# Patient Record
Sex: FEMALE | Race: WHITE | Employment: OTHER | URBAN - METROPOLITAN AREA
[De-identification: names, ages, dates, MRNs, and addresses within clinical notes are randomized per-mention and may not be internally consistent; named-entity substitution may affect disease eponyms.]

---

## 2017-01-04 ENCOUNTER — LAB ENCOUNTER (OUTPATIENT)
Dept: LAB | Age: 82
End: 2017-01-04
Attending: INTERNAL MEDICINE
Payer: MEDICARE

## 2017-01-04 DIAGNOSIS — I48.91 ATRIAL FIBRILLATION (HCC): Primary | ICD-10-CM

## 2017-01-04 LAB
INR BLD: 3.2 (ref 0.9–1.2)
PROTHROMBIN TIME: 32.3 SECONDS (ref 11.8–14.5)

## 2017-01-04 PROCEDURE — 85610 PROTHROMBIN TIME: CPT

## 2017-01-04 PROCEDURE — 36415 COLL VENOUS BLD VENIPUNCTURE: CPT

## 2017-01-06 ENCOUNTER — TELEPHONE (OUTPATIENT)
Dept: PULMONOLOGY | Facility: CLINIC | Age: 82
End: 2017-01-06

## 2017-01-06 RX ORDER — AMOXICILLIN AND CLAVULANATE POTASSIUM 875; 125 MG/1; MG/1
1 TABLET, FILM COATED ORAL 2 TIMES DAILY
Qty: 10 TABLET | Refills: 0 | Status: SHIPPED | OUTPATIENT
Start: 2017-01-06 | End: 2017-03-24 | Stop reason: ALTCHOICE

## 2017-01-06 NOTE — TELEPHONE ENCOUNTER
Pt. Mikayla Jernigan, states that pt. continues to have a very wet cough. She is not sure if pt. Should come in to see [address] [facility]? Please call to discuss. Previous encounter is closed.

## 2017-01-06 NOTE — TELEPHONE ENCOUNTER
Pt daughter reports pt still has productive cough starting x 2 weeks ago, expelling yellow colored sputum. Pt finished rx of z-pack and PSC last week. Pt daughter denies pt has fever and or SOB.  Pt daughter reports the cough sounds raspy and mucous seems t

## 2017-01-06 NOTE — TELEPHONE ENCOUNTER
Verbal Order from Dr. Cinda Cam, Amoxicillin 875 mg 1 tab PO BID #10 and for pt to inquire from pharmacist which OTC cough syrup to use. Pt daughter informed of AR orders, pharmacy verified, pt daughter voiced understanding.

## 2017-01-24 NOTE — TELEPHONE ENCOUNTER
Current Outpatient Prescriptions:  lisinopril (PRINIVIL,ZESTRIL) 40 MG Oral Tab TAKE 1 TABLET DAILY Disp: 90 tablet Rfl: 1     Refill

## 2017-01-25 RX ORDER — LISINOPRIL 40 MG/1
40 TABLET ORAL
Qty: 90 TABLET | Refills: 1 | Status: SHIPPED | OUTPATIENT
Start: 2017-01-25 | End: 2017-07-17

## 2017-01-26 ENCOUNTER — APPOINTMENT (OUTPATIENT)
Dept: LAB | Age: 82
End: 2017-01-26
Attending: INTERNAL MEDICINE
Payer: MEDICARE

## 2017-01-26 DIAGNOSIS — I48.91 ATRIAL FIBRILLATION (HCC): ICD-10-CM

## 2017-01-26 LAB
INR BLD: 2.4 (ref 0.9–1.2)
PROTHROMBIN TIME: 25.9 SECONDS (ref 11.8–14.5)

## 2017-01-26 PROCEDURE — 85610 PROTHROMBIN TIME: CPT

## 2017-01-26 PROCEDURE — 36415 COLL VENOUS BLD VENIPUNCTURE: CPT

## 2017-02-02 ENCOUNTER — TELEPHONE (OUTPATIENT)
Dept: PULMONOLOGY | Facility: CLINIC | Age: 82
End: 2017-02-02

## 2017-02-03 ENCOUNTER — PRIOR ORIGINAL RECORDS (OUTPATIENT)
Dept: OTHER | Age: 82
End: 2017-02-03

## 2017-02-03 ENCOUNTER — MYAURORA ACCOUNT LINK (OUTPATIENT)
Dept: OTHER | Age: 82
End: 2017-02-03

## 2017-02-03 NOTE — TELEPHONE ENCOUNTER
Attempted to call Dr. Nasrin Curry office but was disconnected twice. Sent fax stating we do no have any current lipid or CMP results for this patient. Last lipid was 2012. If they need anything else they can contact our office.

## 2017-02-03 NOTE — TELEPHONE ENCOUNTER
Spoke to patient who gave permission to speak to her daughter Risa Arias. She gave authorization to fax any labs or notes that are needed.

## 2017-02-14 ENCOUNTER — PRIOR ORIGINAL RECORDS (OUTPATIENT)
Dept: OTHER | Age: 82
End: 2017-02-14

## 2017-02-14 ENCOUNTER — HOSPITAL ENCOUNTER (OUTPATIENT)
Dept: GENERAL RADIOLOGY | Facility: HOSPITAL | Age: 82
Discharge: HOME | End: 2017-02-14
Attending: INTERNAL MEDICINE
Payer: MEDICARE

## 2017-02-14 DIAGNOSIS — R05.9 COUGH: Primary | ICD-10-CM

## 2017-02-14 DIAGNOSIS — R05.9 COUGH IN ADULT: ICD-10-CM

## 2017-02-14 LAB
ALBUMIN SERPL BCP-MCNC: 3.6 G/DL (ref 3.5–4.8)
ANION GAP SERPL CALC-SCNC: 9 MMOL/L (ref 0–18)
BASOPHILS # BLD: 0 K/UL (ref 0–0.2)
BASOPHILS NFR BLD: 1 %
BUN SERPL-MCNC: 18 MG/DL (ref 8–20)
BUN/CREAT SERPL: 18.6 (ref 10–20)
CALCIUM SERPL-MCNC: 8.8 MG/DL (ref 8.5–10.5)
CHLORIDE SERPL-SCNC: 98 MMOL/L (ref 95–110)
CO2 SERPL-SCNC: 31 MMOL/L (ref 22–32)
CREAT SERPL-MCNC: 0.97 MG/DL (ref 0.5–1.5)
EOSINOPHIL # BLD: 0 K/UL (ref 0–0.7)
EOSINOPHIL NFR BLD: 0 %
ERYTHROCYTE [DISTWIDTH] IN BLOOD BY AUTOMATED COUNT: 15.7 % (ref 11–15)
GLUCOSE SERPL-MCNC: 125 MG/DL (ref 70–99)
HCT VFR BLD AUTO: 33.9 % (ref 35–48)
HGB BLD-MCNC: 11.3 G/DL (ref 12–16)
LYMPHOCYTES # BLD: 0.5 K/UL (ref 1–4)
LYMPHOCYTES NFR BLD: 14 %
MCH RBC QN AUTO: 31.6 PG (ref 27–32)
MCHC RBC AUTO-ENTMCNC: 33.5 G/DL (ref 32–37)
MCV RBC AUTO: 94.4 FL (ref 80–100)
MONOCYTES # BLD: 0.5 K/UL (ref 0–1)
MONOCYTES NFR BLD: 16 %
NEUTROPHILS # BLD AUTO: 2.4 K/UL (ref 1.8–7.7)
NEUTROPHILS NFR BLD: 70 %
OSMOLALITY UR CALC.SUM OF ELEC: 289 MOSM/KG (ref 275–295)
PHOSPHATE SERPL-MCNC: 3.5 MG/DL (ref 2.4–4.7)
PLATELET # BLD AUTO: 169 K/UL (ref 140–400)
PMV BLD AUTO: 7.9 FL (ref 7.4–10.3)
POTASSIUM SERPL-SCNC: 4 MMOL/L (ref 3.3–5.1)
RBC # BLD AUTO: 3.59 M/UL (ref 3.7–5.4)
SODIUM SERPL-SCNC: 138 MMOL/L (ref 136–144)
WBC # BLD AUTO: 3.4 K/UL (ref 4–11)

## 2017-02-14 PROCEDURE — 80069 RENAL FUNCTION PANEL: CPT | Performed by: INTERNAL MEDICINE

## 2017-02-14 PROCEDURE — 36415 COLL VENOUS BLD VENIPUNCTURE: CPT | Performed by: INTERNAL MEDICINE

## 2017-02-14 PROCEDURE — 85025 COMPLETE CBC W/AUTO DIFF WBC: CPT | Performed by: INTERNAL MEDICINE

## 2017-02-14 PROCEDURE — 71020 XR CHEST PA + LAT CHEST (CPT=71020): CPT

## 2017-02-15 ENCOUNTER — HOSPITAL (OUTPATIENT)
Dept: OTHER | Age: 82
End: 2017-02-15
Attending: FAMILY MEDICINE

## 2017-02-15 LAB
A/G RATIO_: 1.1
ABS LYMPH: 0.8 K/CUMM (ref 1–3.5)
ABS MONO: 0.6 K/CUMM (ref 0.1–0.8)
ABS NEUTRO: 6.4 K/CUMM (ref 2–8)
ALBUMIN: 3.6 G/DL (ref 3.5–5)
ALK PHOS: 50 UNIT/L (ref 50–124)
ALT/GPT: 21 UNIT/L (ref 0–55)
ANION GAP SERPL CALC-SCNC: 13 MEQ/L (ref 10–20)
APTT PPP: 23 SECONDS (ref 22–30)
AST/GOT: 50 UNIT/L (ref 5–34)
BASOPHIL: 0 % (ref 0–1)
BILI TOTAL: 1.2 MG/DL (ref 0.2–1)
BUN SERPL-MCNC: 19 MG/DL (ref 6–20)
CALCIUM: 8.5 MG/DL (ref 8.4–10.2)
CHLORIDE: 98 MEQ/L (ref 97–107)
CONTROL LINE: PRESENT
CREATININE: 0.82 MG/DL (ref 0.6–1.3)
DIFF_TYPE?: ABNORMAL
EOSINOPHIL: 0 % (ref 0–6)
GLOBULIN_: 3.2 G/DL (ref 2–4.1)
GLUCOSE LVL: 174 MG/DL (ref 70–99)
HCT VFR BLD CALC: 34 % (ref 33–45)
HEMOLYSIS 2+: NEGATIVE
HEMOLYSIS 4+: NEGATIVE
HEMOLYSIS 4+: NEGATIVE
HGB BLD-MCNC: 11.4 G/DL (ref 11–15)
ICTERIC 4+: NEGATIVE
ICTERIC 4+: NEGATIVE
IMMATURE GRAN: 0.4 % (ref 0–0.3)
INFLUENZ A: POSITIVE
INFLUENZ B: ABNORMAL
INFLUENZ COMMNT: ABNORMAL
INR: 1.5 (ref 0.9–1.1)
INSTR WBC: 7.8 K/CUMM (ref 4–11)
LACTIC ACID: 1.1 MMOL/L (ref 0.5–2.2)
LIPEMIC 3+: NEGATIVE
LYMPHOCYTE: 10 %
MCH RBC QN AUTO: 32 PG (ref 25–35)
MCHC RBC AUTO-ENTMCNC: 33 G/DL (ref 32–37)
MCV RBC AUTO: 96 FL (ref 78–97)
MONOCYTE: 7 %
NEUTROPHIL: 82 %
NRBC BLD MANUAL-RTO: 0 % (ref 0–0.2)
PLATELET: 168 K/CUMM (ref 150–450)
POTASSIUM: 3.7 MEQ/L (ref 3.5–5.1)
PROTHROMBIN TIME: 16 SECONDS (ref 9.7–11.6)
RBC # BLD: 3.6 M/CUMM (ref 3.7–5.2)
RDW: 14.8 % (ref 11.5–14.5)
SODIUM: 137 MEQ/L (ref 136–145)
TCO2: 30 MEQ/L (ref 19–29)
TOTAL CK: 435 U/L (ref 29–168)
TOTAL PROTEIN: 6.8 G/DL (ref 6.4–8.3)
TROPONIN I: 0.04 NG/ML
TROPONIN I: 0.06 NG/ML
UA APPEAR POC: CLEAR
UA APPEAR: CLEAR
UA BACTERIA: ABNORMAL
UA BILI POC: NEGATIVE
UA BILI: NEGATIVE
UA BLOOD POC: ABNORMAL
UA BLOOD: ABNORMAL
UA COLOR POC: YELLOW
UA COLOR: YELLOW
UA EPITHELIAL: ABNORMAL
UA GLUCOSE POC: NEGATIVE
UA GLUCOSE: NEGATIVE
UA KETONES POC: NEGATIVE
UA KETONES: NEGATIVE
UA LEUK EST POC: NEGATIVE
UA LEUK EST: NEGATIVE
UA NITRITE POC: NEGATIVE
UA NITRITE: NEGATIVE
UA PH POC: 5.5 (ref 5–7)
UA PH: 5.5 (ref 5–7)
UA PROTEIN POC: ABNORMAL
UA PROTEIN: ABNORMAL
UA RBC: ABNORMAL
UA SPEC GRAV POC: 1.02 (ref 1.01–1.02)
UA SPEC GRAV: 1.02 (ref 1.01–1.02)
UA UROBILIN POC: 1 MG/DL
UA UROBILINOGEN: 1 MG/DL (ref 0.2–1)
UA WBC: ABNORMAL
WBC # BLD: 7.8 K/CUMM (ref 4–11)

## 2017-02-16 LAB
ABS LYMPH: 0.7 K/CUMM (ref 1–3.5)
ABS MONO: 0.5 K/CUMM (ref 0.1–0.8)
ABS NEUTRO: 4.3 K/CUMM (ref 2–8)
ANION GAP SERPL CALC-SCNC: 9 MEQ/L (ref 10–20)
BASOPHIL: 0 % (ref 0–1)
BUN SERPL-MCNC: 17 MG/DL (ref 6–20)
CALCIUM: 8 MG/DL (ref 8.4–10.2)
CHLORIDE: 101 MEQ/L (ref 97–107)
CREATININE: 0.84 MG/DL (ref 0.6–1.3)
DEVICE SN: NORMAL
DIFF_TYPE?: ABNORMAL
EOSINOPHIL: 0 % (ref 0–6)
GLUCOSE LVL: 98 MG/DL (ref 70–99)
HCT VFR BLD CALC: 30 % (ref 33–45)
HEMOLYSIS 2+: NEGATIVE
HEMOLYSIS 4+: NEGATIVE
HGB BLD-MCNC: 10.1 G/DL (ref 11–15)
ICTERIC 4+: NEGATIVE
IMMATURE GRAN: 0.4 % (ref 0–0.3)
INR: 1.7 (ref 0.9–1.1)
INSTR WBC: 5.5 K/CUMM (ref 4–11)
LYMPHOCYTE: 13 %
MCH RBC QN AUTO: 32 PG (ref 25–35)
MCHC RBC AUTO-ENTMCNC: 33 G/DL (ref 32–37)
MCV RBC AUTO: 97 FL (ref 78–97)
MONOCYTE: 9 %
NEUTROPHIL: 78 %
NRBC BLD MANUAL-RTO: 0 % (ref 0–0.2)
PLATELET: 126 K/CUMM (ref 150–450)
PLT ESTIMATE: ABNORMAL
POC_GLU: 79 MG/DL (ref 70–99)
POC_GLU: 88 MG/DL (ref 70–99)
POC_GLU: 95 MG/DL (ref 70–99)
POC_GLU: 99 MG/DL (ref 70–99)
POTASSIUM: 3.7 MEQ/L (ref 3.5–5.1)
PROTHROMBIN TIME: 17.6 SECONDS (ref 9.7–11.6)
RBC # BLD: 3.11 M/CUMM (ref 3.7–5.2)
RBC MORPH: ABNORMAL
RDW: 15 % (ref 11.5–14.5)
SODIUM: 139 MEQ/L (ref 136–145)
TCO2: 33 MEQ/L (ref 19–29)
TECH_ID: NORMAL
TROPONIN I: 0.06 NG/ML
WBC # BLD: 5.5 K/CUMM (ref 4–11)

## 2017-02-17 LAB
ABS LYMPH: 0.8 K/CUMM (ref 1–3.5)
ABS MONO: 0.4 K/CUMM (ref 0.1–0.8)
ABS NEUTRO: 2.8 K/CUMM (ref 2–8)
ANION GAP SERPL CALC-SCNC: 9 MEQ/L (ref 10–20)
BASOPHIL: 0 % (ref 0–1)
BUN SERPL-MCNC: 12 MG/DL (ref 6–20)
CALCIUM: 8.5 MG/DL (ref 8.4–10.2)
CHLORIDE: 101 MEQ/L (ref 97–107)
CREATININE: 0.68 MG/DL (ref 0.6–1.3)
DEVICE SN: ABNORMAL
DEVICE SN: NORMAL
DIFF_TYPE?: ABNORMAL
EOSINOPHIL: 0 % (ref 0–6)
GLUCOSE LVL: 89 MG/DL (ref 70–99)
HCT VFR BLD CALC: 33 % (ref 33–45)
HEMOLYSIS 2+: NEGATIVE
HGB BLD-MCNC: 10.6 G/DL (ref 11–15)
IMMATURE GRAN: 0.5 % (ref 0–0.3)
INR: 2.5 (ref 0.9–1.1)
INSTR WBC: 4 K/CUMM (ref 4–11)
LYMPHOCYTE: 20 %
MCH RBC QN AUTO: 32 PG (ref 25–35)
MCHC RBC AUTO-ENTMCNC: 32 G/DL (ref 32–37)
MCV RBC AUTO: 98 FL (ref 78–97)
MONOCYTE: 9 %
NEUTROPHIL: 70 %
NRBC BLD MANUAL-RTO: 0 % (ref 0–0.2)
PLATELET: 140 K/CUMM (ref 150–450)
PLT ESTIMATE: ABNORMAL
POC_GLU: 163 MG/DL (ref 70–99)
POC_GLU: 87 MG/DL (ref 70–99)
POC_GLU: 87 MG/DL (ref 70–99)
POC_GLU: 99 MG/DL (ref 70–99)
POTASSIUM: 3.5 MEQ/L (ref 3.5–5.1)
PROTHROMBIN TIME: 25.9 SECONDS (ref 9.7–11.6)
RBC # BLD: 3.35 M/CUMM (ref 3.7–5.2)
RBC MORPH: ABNORMAL
RDW: 15 % (ref 11.5–14.5)
SODIUM: 139 MEQ/L (ref 136–145)
TCO2: 33 MEQ/L (ref 19–29)
TECH_ID: ABNORMAL
TECH_ID: NORMAL
WBC # BLD: 4 K/CUMM (ref 4–11)

## 2017-02-18 LAB
C DIFF COMMENT: NORMAL
C DIFF TOXIN PCR: NOT DETECTED
DEVICE SN: ABNORMAL
DEVICE SN: NORMAL
GROSS: NORMAL
INR: 3.5 (ref 0.9–1.1)
POC_GLU: 102 MG/DL (ref 70–99)
POC_GLU: 105 MG/DL (ref 70–99)
POC_GLU: 137 MG/DL (ref 70–99)
POC_GLU: 93 MG/DL (ref 70–99)
PROTHROMBIN TIME: 36.5 SECONDS (ref 9.7–11.6)
TECH_ID: ABNORMAL
TECH_ID: NORMAL

## 2017-02-19 LAB
DEVICE SN: ABNORMAL
DEVICE SN: ABNORMAL
INR: 3.1 (ref 0.9–1.1)
POC_GLU: 113 MG/DL (ref 70–99)
POC_GLU: 125 MG/DL (ref 70–99)
PROTHROMBIN TIME: 32.6 SECONDS (ref 9.7–11.6)
TECH_ID: ABNORMAL
TECH_ID: ABNORMAL

## 2017-02-20 ENCOUNTER — TELEPHONE (OUTPATIENT)
Dept: PULMONOLOGY | Facility: CLINIC | Age: 82
End: 2017-02-20

## 2017-02-20 NOTE — TELEPHONE ENCOUNTER
----- Message from Lizett Fajardo MD sent at 2/16/2017  3:30 PM CST -----  RN,  Please call the patient to let her know that the blood tests were good.

## 2017-02-21 ENCOUNTER — APPOINTMENT (OUTPATIENT)
Dept: LAB | Age: 82
End: 2017-02-21
Attending: INTERNAL MEDICINE
Payer: COMMERCIAL

## 2017-02-21 ENCOUNTER — OFFICE VISIT (OUTPATIENT)
Dept: PULMONOLOGY | Facility: CLINIC | Age: 82
End: 2017-02-21

## 2017-02-21 VITALS
WEIGHT: 122.38 LBS | BODY MASS INDEX: 25 KG/M2 | RESPIRATION RATE: 18 BRPM | HEIGHT: 58.5 IN | OXYGEN SATURATION: 94 % | SYSTOLIC BLOOD PRESSURE: 122 MMHG | DIASTOLIC BLOOD PRESSURE: 75 MMHG | HEART RATE: 98 BPM

## 2017-02-21 DIAGNOSIS — I48.91 ATRIAL FIBRILLATION (HCC): ICD-10-CM

## 2017-02-21 DIAGNOSIS — R05.9 COUGH IN ADULT: Primary | ICD-10-CM

## 2017-02-21 DIAGNOSIS — R91.1 PULMONARY NODULE: ICD-10-CM

## 2017-02-21 LAB
INR BLD: 2.2 (ref 0.9–1.2)
PROTHROMBIN TIME: 23.6 SECONDS (ref 11.8–14.5)

## 2017-02-21 PROCEDURE — G0463 HOSPITAL OUTPT CLINIC VISIT: HCPCS | Performed by: INTERNAL MEDICINE

## 2017-02-21 PROCEDURE — 99213 OFFICE O/P EST LOW 20 MIN: CPT | Performed by: INTERNAL MEDICINE

## 2017-02-21 PROCEDURE — 36415 COLL VENOUS BLD VENIPUNCTURE: CPT

## 2017-02-21 PROCEDURE — 85610 PROTHROMBIN TIME: CPT

## 2017-02-21 PROCEDURE — 94761 N-INVAS EAR/PLS OXIMETRY MLT: CPT | Performed by: INTERNAL MEDICINE

## 2017-02-21 RX ORDER — HALOPERIDOL 2 MG/1
2 TABLET ORAL NIGHTLY
Qty: 30 TABLET | Refills: 6 | Status: SHIPPED | OUTPATIENT
Start: 2017-02-21 | End: 2017-03-24

## 2017-02-21 NOTE — PROGRESS NOTES
The patient is an 42-year-old female who I know well from prior evaluation who comes in now for follow-up. There is a sundowning phenomenon.   She was recently hospitalized at [facility] with influenza and urinary tract infection with possible pneumo

## 2017-03-07 ENCOUNTER — PRIOR ORIGINAL RECORDS (OUTPATIENT)
Dept: OTHER | Age: 82
End: 2017-03-07

## 2017-03-07 LAB — INR: 4

## 2017-03-15 ENCOUNTER — TELEPHONE (OUTPATIENT)
Dept: PULMONOLOGY | Facility: CLINIC | Age: 82
End: 2017-03-15

## 2017-03-15 NOTE — TELEPHONE ENCOUNTER
CMS form for  O2 re cert - needs for medicare - was faxed over this morning - asking if it was received

## 2017-03-24 PROBLEM — G30.9 ALZHEIMER'S DEMENTIA WITH BEHAVIORAL DISTURBANCE, UNSPECIFIED TIMING OF DEMENTIA ONSET: Status: ACTIVE | Noted: 2017-03-24

## 2017-03-24 PROBLEM — J44.9 COPD (CHRONIC OBSTRUCTIVE PULMONARY DISEASE) WITH CHRONIC BRONCHITIS (HCC): Status: ACTIVE | Noted: 2017-03-24

## 2017-03-24 PROBLEM — G30.9 ALZHEIMER'S DEMENTIA WITH BEHAVIORAL DISTURBANCE, UNSPECIFIED TIMING OF DEMENTIA ONSET (HCC): Status: ACTIVE | Noted: 2017-03-24

## 2017-03-24 PROBLEM — F02.81 ALZHEIMER'S DEMENTIA WITH BEHAVIORAL DISTURBANCE, UNSPECIFIED TIMING OF DEMENTIA ONSET (HCC): Status: ACTIVE | Noted: 2017-03-24

## 2017-03-24 PROBLEM — J18.9 PNEUMONIA DUE TO INFECTIOUS ORGANISM, UNSPECIFIED LATERALITY, UNSPECIFIED PART OF LUNG: Status: ACTIVE | Noted: 2017-03-24

## 2017-03-24 PROBLEM — F02.81 ALZHEIMER'S DEMENTIA WITH BEHAVIORAL DISTURBANCE, UNSPECIFIED TIMING OF DEMENTIA ONSET: Status: ACTIVE | Noted: 2017-03-24

## 2017-04-23 ENCOUNTER — HOSPITAL (OUTPATIENT)
Dept: OTHER | Age: 82
End: 2017-04-23
Attending: FAMILY MEDICINE

## 2017-04-23 LAB
A/G RATIO_: 1
ABG GLU: 125 MG/G (ref 65–95)
ABG HCT: 33 % (ref 37–50)
ABG ION CALCIUM: 4.7 MG/DL (ref 4.5–5.3)
ABG K: 3.67 MMOL/L (ref 3.5–5.3)
ABG NA: 138.2 MMOL/L (ref 135–148)
ABS LYMPH: 1 K/CUMM (ref 1–3.5)
ABS LYMPH: 1.5 K/CUMM (ref 1–3.5)
ABS MONO: 0.6 K/CUMM (ref 0.1–0.8)
ABS MONO: 0.6 K/CUMM (ref 0.1–0.8)
ABS NEUTRO: 3.3 K/CUMM (ref 2–8)
ABS NEUTRO: 3.6 K/CUMM (ref 2–8)
ALBUMIN: 3.6 G/DL (ref 3.5–5)
ALK PHOS: 60 UNIT/L (ref 50–124)
ALLEN'S TEST: POSITIVE
ALT/GPT: 14 UNIT/L (ref 0–55)
AMMONIA LVL: 26 UMOL/L (ref 18–72)
ANALYZER: ABNORMAL
ANION GAP SERPL CALC-SCNC: 11 MEQ/L (ref 10–20)
ANION GAP SERPL CALC-SCNC: 13 MEQ/L (ref 10–20)
AST/GOT: 28 UNIT/L (ref 5–34)
BASOPHIL: 0 % (ref 0–1)
BASOPHIL: 1 % (ref 0–1)
BEVT: 4 MMOL/L (ref -2–3)
BILI TOTAL: 0.4 MG/DL (ref 0.2–1)
BUN SERPL-MCNC: 20 MG/DL (ref 6–20)
BUN SERPL-MCNC: 25 MG/DL (ref 6–20)
CALCIUM: 8.6 MG/DL (ref 8.4–10.2)
CALCIUM: 9.1 MG/DL (ref 8.4–10.2)
CHLORIDE: 102 MEQ/L (ref 97–107)
CHLORIDE: 105 MEQ/L (ref 97–107)
COHB: 0.3 % (ref 0.5–1.5)
CREATININE: 0.83 MG/DL (ref 0.6–1.3)
CREATININE: 1.09 MG/DL (ref 0.6–1.3)
DEVICE SN: NORMAL
DIFF_TYPE?: ABNORMAL
DIFF_TYPE?: ABNORMAL
DRAW SITE: ABNORMAL
EOSINOPHIL: 1 % (ref 0–6)
EOSINOPHIL: 1 % (ref 0–6)
GLOBULIN_: 3.5 G/DL (ref 2–4.1)
GLUCOSE LVL: 129 MG/DL (ref 70–99)
GLUCOSE LVL: 98 MG/DL (ref 70–99)
HCO3: 29.3 MMOL/L (ref 22–26)
HCT VFR BLD CALC: 32 % (ref 33–45)
HCT VFR BLD CALC: 34 % (ref 33–45)
HEMOLYSIS 1+: NEGATIVE
HEMOLYSIS 2+: NEGATIVE
HEMOLYSIS 2+: NEGATIVE
HGB A1C: 5.9 %
HGB BLD-MCNC: 10 G/DL (ref 11–15)
HGB BLD-MCNC: 10.8 G/DL (ref 11–15)
IMMATURE GRAN: 0.2 % (ref 0–0.3)
IMMATURE GRAN: 0.3 % (ref 0–0.3)
INR: 1.6 (ref 0.9–1.1)
INR: 1.7 (ref 0.9–1.1)
INSTR WBC: 4.9 K/CUMM (ref 4–11)
INSTR WBC: 6 K/CUMM (ref 4–11)
LIPEMIC 3+: NEGATIVE
LYMPHOCYTE: 20 %
LYMPHOCYTE: 26 %
MCH RBC QN AUTO: 31 PG (ref 25–35)
MCH RBC QN AUTO: 31 PG (ref 25–35)
MCHC RBC AUTO-ENTMCNC: 32 G/DL (ref 32–37)
MCHC RBC AUTO-ENTMCNC: 32 G/DL (ref 32–37)
MCV RBC AUTO: 98 FL (ref 78–97)
MCV RBC AUTO: 99 FL (ref 78–97)
METHB: 0.5 % (ref 0–1.5)
MODE: ABNORMAL
MONOCYTE: 11 %
MONOCYTE: 12 %
NEUTROPHIL: 61 %
NEUTROPHIL: 66 %
NRBC BLD MANUAL-RTO: 0 % (ref 0–0.2)
NRBC BLD MANUAL-RTO: 0 % (ref 0–0.2)
O2 SAT(EST): 95.9 %
O2HB: 95.1 % (ref 94–97)
PCO2: 47.1 MMHG (ref 35–45)
PH: 7.41 (ref 7.35–7.45)
PLATELET: 179 K/CUMM (ref 150–450)
PLATELET: 223 K/CUMM (ref 150–450)
PO2: 85 MMHG (ref 75–100)
POC_GLU: 95 MG/DL (ref 70–99)
POTASSIUM: 3.6 MEQ/L (ref 3.5–5.1)
POTASSIUM: 3.8 MEQ/L (ref 3.5–5.1)
PROTHROMBIN TIME: 17.1 SECONDS (ref 9.7–11.6)
PROTHROMBIN TIME: 18.1 SECONDS (ref 9.7–11.6)
RBC # BLD: 3.2 M/CUMM (ref 3.7–5.2)
RBC # BLD: 3.45 M/CUMM (ref 3.7–5.2)
RDW: 14.7 % (ref 11.5–14.5)
RDW: 14.9 % (ref 11.5–14.5)
REPORT STATUS: 2 L/MIN
SAMPLE TYPE: ABNORMAL
SODIUM: 139 MEQ/L (ref 136–145)
SODIUM: 141 MEQ/L (ref 136–145)
TCO2: 28 MEQ/L (ref 19–29)
TCO2: 29 MEQ/L (ref 19–29)
TECH ID: 6052
TECH_ID: NORMAL
THB: 11.2 G/DL (ref 12–18)
TOTAL PROTEIN: 7.1 G/DL (ref 6.4–8.3)
UA APPEAR: CLEAR
UA BACTERIA: ABNORMAL
UA BILI: NEGATIVE
UA BLOOD: ABNORMAL
UA COLOR: YELLOW
UA EPITHELIAL: ABNORMAL
UA GLUCOSE: NEGATIVE
UA KETONES: NEGATIVE
UA LEUK EST: NEGATIVE
UA NITRITE: NEGATIVE
UA PH: 6 (ref 5–7)
UA PROTEIN: ABNORMAL
UA RBC: ABNORMAL
UA SPEC GRAV: 1.02 (ref 1.01–1.02)
UA UROBILINOGEN: 1 MG/DL (ref 0.2–1)
UA WBC: ABNORMAL
WBC # BLD: 4.9 K/CUMM (ref 4–11)
WBC # BLD: 6 K/CUMM (ref 4–11)

## 2017-04-24 LAB
CHOLESTEROL: 118 MG/DL
DEVICE SN: ABNORMAL
DEVICE SN: ABNORMAL
DEVICE SN: NORMAL
DEVICE SN: NORMAL
HDLC SERPL-MCNC: 52 MG/DL (ref 40–60)
INR: 1.8 (ref 0.9–1.1)
LDLC SERPL CALC-MCNC: 57 MG/DL
POC_GLU: 149 MG/DL (ref 70–99)
POC_GLU: 184 MG/DL (ref 70–99)
POC_GLU: 88 MG/DL (ref 70–99)
POC_GLU: 90 MG/DL (ref 70–99)
PROTHROMBIN TIME: 18.6 SECONDS (ref 9.7–11.6)
TECH_ID: ABNORMAL
TECH_ID: ABNORMAL
TECH_ID: NORMAL
TECH_ID: NORMAL
TRIGL SERPL-MCNC: 47 MG/DL

## 2017-04-25 LAB
INR: 2 (ref 0.9–1.1)
PROTHROMBIN TIME: 20.6 SECONDS (ref 9.7–11.6)

## 2017-04-26 LAB
INR: 1.6 (ref 0.9–1.1)
PROTHROMBIN TIME: 16.9 SECONDS (ref 9.7–11.6)

## 2017-04-27 LAB
ABS LYMPH: 1.4 K/CUMM (ref 1–3.5)
ABS MONO: 0.5 K/CUMM (ref 0.1–0.8)
ABS NEUTRO: 3.2 K/CUMM (ref 2–8)
BASOPHIL: 1 % (ref 0–1)
DIFF_TYPE?: ABNORMAL
EOSINOPHIL: 2 % (ref 0–6)
HCT VFR BLD CALC: 33 % (ref 33–45)
HGB BLD-MCNC: 10.5 G/DL (ref 11–15)
IMMATURE GRAN: 0.2 % (ref 0–0.3)
INSTR WBC: 5.2 K/CUMM (ref 4–11)
LYMPHOCYTE: 27 %
MCH RBC QN AUTO: 32 PG (ref 25–35)
MCHC RBC AUTO-ENTMCNC: 32 G/DL (ref 32–37)
MCV RBC AUTO: 98 FL (ref 78–97)
MONOCYTE: 10 %
NEUTROPHIL: 61 %
NRBC BLD MANUAL-RTO: 0 % (ref 0–0.2)
PLATELET: 183 K/CUMM (ref 150–450)
RBC # BLD: 3.31 M/CUMM (ref 3.7–5.2)
RDW: 14.6 % (ref 11.5–14.5)
WBC # BLD: 5.2 K/CUMM (ref 4–11)

## 2017-05-01 PROBLEM — F01.51 VASCULAR DEMENTIA WITH BEHAVIOR DISTURBANCE (HCC): Status: ACTIVE | Noted: 2017-05-01

## 2017-05-01 PROBLEM — F01.518 VASCULAR DEMENTIA WITH BEHAVIOR DISTURBANCE: Status: ACTIVE | Noted: 2017-05-01

## 2017-05-02 ENCOUNTER — HOSPITAL (OUTPATIENT)
Dept: OTHER | Age: 82
End: 2017-05-02
Attending: INTERNAL MEDICINE

## 2017-05-02 LAB
ABS LYMPH: 1.2 K/CUMM (ref 1–3.5)
ABS MONO: 0.7 K/CUMM (ref 0.1–0.8)
ABS NEUTRO: 4.3 K/CUMM (ref 2–8)
ANION GAP SERPL CALC-SCNC: 12 MEQ/L (ref 10–20)
BASOPHIL: 1 % (ref 0–1)
BUN SERPL-MCNC: 21 MG/DL (ref 6–20)
CALCIUM: 9.1 MG/DL (ref 8.4–10.2)
CHLORIDE: 101 MEQ/L (ref 97–107)
CREATININE: 0.89 MG/DL (ref 0.6–1.3)
DIFF_TYPE?: ABNORMAL
EOSINOPHIL: 1 % (ref 0–6)
GLUCOSE LVL: 112 MG/DL (ref 70–99)
HCT VFR BLD CALC: 32 % (ref 33–45)
HEMOLYSIS 2+: NEGATIVE
HGB BLD-MCNC: 10.5 G/DL (ref 11–15)
IMMATURE GRAN: 0.3 % (ref 0–0.3)
INSTR WBC: 6.4 K/CUMM (ref 4–11)
LYMPHOCYTE: 19 %
MCH RBC QN AUTO: 31 PG (ref 25–35)
MCHC RBC AUTO-ENTMCNC: 32 G/DL (ref 32–37)
MCV RBC AUTO: 97 FL (ref 78–97)
MONOCYTE: 11 %
NEUTROPHIL: 67 %
NRBC BLD MANUAL-RTO: 0 % (ref 0–0.2)
PLATELET: 188 K/CUMM (ref 150–450)
POTASSIUM: 4 MEQ/L (ref 3.5–5.1)
RBC # BLD: 3.34 M/CUMM (ref 3.7–5.2)
RDW: 14.7 % (ref 11.5–14.5)
SODIUM: 137 MEQ/L (ref 136–145)
TCO2: 28 MEQ/L (ref 19–29)
UA APPEAR: CLEAR
UA BACTERIA: ABNORMAL
UA BILI: NEGATIVE
UA BLOOD: NEGATIVE
UA CAST: ABNORMAL
UA COLOR: YELLOW
UA EPITHELIAL: ABNORMAL
UA GLUCOSE: NEGATIVE
UA KETONES: NEGATIVE
UA LEUK EST: ABNORMAL
UA MUCOUS: ABNORMAL
UA NITRITE: NEGATIVE
UA PH: 6 (ref 5–7)
UA PROTEIN: NEGATIVE
UA RBC: ABNORMAL
UA SPEC GRAV: 1.01 (ref 1.01–1.02)
UA UROBILINOGEN: 0.2 MG/DL (ref 0.2–1)
UA WBC: ABNORMAL
WBC # BLD: 6.4 K/CUMM (ref 4–11)

## 2017-05-03 LAB
ABS LYMPH: 0.8 K/CUMM (ref 1–3.5)
ABS MONO: 0.5 K/CUMM (ref 0.1–0.8)
ABS NEUTRO: 4.2 K/CUMM (ref 2–8)
ANION GAP SERPL CALC-SCNC: 11 MEQ/L (ref 10–20)
BASOPHIL: 1 % (ref 0–1)
BUN SERPL-MCNC: 15 MG/DL (ref 6–20)
CALCIUM: 9.2 MG/DL (ref 8.4–10.2)
CHLORIDE: 106 MEQ/L (ref 97–107)
CREATININE: 0.84 MG/DL (ref 0.6–1.3)
DIFF_TYPE?: ABNORMAL
EOSINOPHIL: 2 % (ref 0–6)
GLUCOSE LVL: 183 MG/DL (ref 70–99)
HCT VFR BLD CALC: 36 % (ref 33–45)
HEMOLYSIS 2+: NEGATIVE
HGB BLD-MCNC: 11.5 G/DL (ref 11–15)
IMMATURE GRAN: 0.5 % (ref 0–0.3)
INR: 1.1 (ref 0.9–1.1)
INR: 1.1 (ref 0.9–1.1)
INSTR WBC: 5.7 K/CUMM (ref 4–11)
LYMPHOCYTE: 13 %
MCH RBC QN AUTO: 31 PG (ref 25–35)
MCHC RBC AUTO-ENTMCNC: 32 G/DL (ref 32–37)
MCV RBC AUTO: 98 FL (ref 78–97)
MONOCYTE: 9 %
NEUTROPHIL: 75 %
NRBC BLD MANUAL-RTO: 0 % (ref 0–0.2)
PLATELET: 204 K/CUMM (ref 150–450)
POTASSIUM: 4.2 MEQ/L (ref 3.5–5.1)
PROTHROMBIN TIME: 12.1 SECONDS (ref 9.7–11.6)
PROTHROMBIN TIME: 12.2 SECONDS (ref 9.7–11.6)
RBC # BLD: 3.66 M/CUMM (ref 3.7–5.2)
RDW: 14.7 % (ref 11.5–14.5)
SODIUM: 141 MEQ/L (ref 136–145)
TCO2: 28 MEQ/L (ref 19–29)
WBC # BLD: 5.7 K/CUMM (ref 4–11)

## 2017-05-04 LAB
INR: 1.1 (ref 0.9–1.1)
PROTHROMBIN TIME: 12.1 SECONDS (ref 9.7–11.6)

## 2017-05-05 LAB
INR: 1.1 (ref 0.9–1.1)
PROTHROMBIN TIME: 12.2 SECONDS (ref 9.7–11.6)

## 2017-05-11 ENCOUNTER — PRIOR ORIGINAL RECORDS (OUTPATIENT)
Dept: OTHER | Age: 82
End: 2017-05-11

## 2017-05-12 ENCOUNTER — PRIOR ORIGINAL RECORDS (OUTPATIENT)
Dept: OTHER | Age: 82
End: 2017-05-12

## 2017-05-17 ENCOUNTER — TELEPHONE (OUTPATIENT)
Dept: PULMONOLOGY | Facility: CLINIC | Age: 82
End: 2017-05-17

## 2017-07-17 RX ORDER — LISINOPRIL 40 MG/1
40 TABLET ORAL
Qty: 90 TABLET | Refills: 1 | Status: SHIPPED | OUTPATIENT
Start: 2017-07-17

## 2017-08-02 ENCOUNTER — TELEPHONE (OUTPATIENT)
Dept: RADIATION ONCOLOGY | Facility: HOSPITAL | Age: 82
End: 2017-08-02

## 2017-08-02 NOTE — TELEPHONE ENCOUNTER
Pt will not be having ct scan per Chandler Gutierrez pt is not doing well. She is now in a memory care unit and was on Hosp[facility].

## 2017-08-09 ENCOUNTER — HOSPITAL (OUTPATIENT)
Dept: OTHER | Age: 82
End: 2017-08-09
Attending: INTERNAL MEDICINE

## 2017-08-09 LAB
A/G RATIO_: 0.7
ABS LYMPH: 0.8 K/CUMM (ref 1–3.5)
ABS MONO: 0.6 K/CUMM (ref 0.1–0.8)
ABS NEUTRO: 3.5 K/CUMM (ref 2–8)
ALBUMIN: 2.9 G/DL (ref 3.5–5)
ALK PHOS: 83 UNIT/L (ref 50–124)
ALT/GPT: 11 UNIT/L (ref 0–55)
ANION GAP SERPL CALC-SCNC: 9 MEQ/L (ref 10–20)
AST/GOT: 18 UNIT/L (ref 5–34)
BASOPHIL: 0 % (ref 0–1)
BILI TOTAL: 0.6 MG/DL (ref 0.2–1)
BUN SERPL-MCNC: 15 MG/DL (ref 6–20)
CALCIUM: 9.3 MG/DL (ref 8.4–10.2)
CHLORIDE: 98 MEQ/L (ref 97–107)
CREATININE: 0.84 MG/DL (ref 0.6–1.3)
DEVICE SN: NORMAL
DIFF_TYPE?: ABNORMAL
EOSINOPHIL: 0 % (ref 0–6)
GLOBULIN_: 4.1 G/DL (ref 2–4.1)
GLUCOSE LVL: 83 MG/DL (ref 70–99)
HCT VFR BLD CALC: 35 % (ref 33–45)
HEMOLYSIS 2+: NEGATIVE
HEMOLYSIS 4+: NEGATIVE
HGB BLD-MCNC: 11 G/DL (ref 11–15)
ICTERIC 4+: NEGATIVE
IMMATURE GRAN: 0.4 % (ref 0–0.3)
INR: 1.2 (ref 0.9–1.1)
INSTR WBC: 4.9 K/CUMM (ref 4–11)
LIPEMIC 3+: NEGATIVE
LYMPHOCYTE: 17 %
MCH RBC QN AUTO: 29 PG (ref 25–35)
MCHC RBC AUTO-ENTMCNC: 31 G/DL (ref 32–37)
MCV RBC AUTO: 92 FL (ref 78–97)
MONOCYTE: 12 %
NEUTROPHIL: 71 %
NRBC BLD MANUAL-RTO: 0 % (ref 0–0.2)
PLATELET: 194 K/CUMM (ref 150–450)
POC_GLU: 90 MG/DL (ref 70–99)
POTASSIUM: 3.8 MEQ/L (ref 3.5–5.1)
PROTHROMBIN TIME: 12.7 SECONDS (ref 9.7–11.6)
RBC # BLD: 3.81 M/CUMM (ref 3.7–5.2)
RDW: 18.6 % (ref 11.5–14.5)
SODIUM: 140 MEQ/L (ref 136–145)
TCO2: 37 MEQ/L (ref 19–29)
TECH_ID: NORMAL
TOTAL PROTEIN: 7 G/DL (ref 6.4–8.3)
TROPONIN I: 0.1 NG/ML
WBC # BLD: 4.9 K/CUMM (ref 4–11)

## 2017-08-10 LAB
ABS LYMPH: 1 K/CUMM (ref 1–3.5)
ABS MONO: 0.6 K/CUMM (ref 0.1–0.8)
ABS NEUTRO: 3.8 K/CUMM (ref 2–8)
APTT PPP: 25 SECONDS (ref 22–30)
BASOPHIL: 1 % (ref 0–1)
CHOLESTEROL: 164 MG/DL
DEVICE SN: ABNORMAL
DIFF_TYPE?: ABNORMAL
EOSINOPHIL: 0 % (ref 0–6)
HCT VFR BLD CALC: 36 % (ref 33–45)
HDLC SERPL-MCNC: 42 MG/DL (ref 40–60)
HGB BLD-MCNC: 10.8 G/DL (ref 11–15)
IMMATURE GRAN: 0.6 % (ref 0–0.3)
INR: 1.2 (ref 0.9–1.1)
INSTR WBC: 5.4 K/CUMM (ref 4–11)
LDLC SERPL CALC-MCNC: 105 MG/DL
LYMPHOCYTE: 17 %
MCH RBC QN AUTO: 28 PG (ref 25–35)
MCHC RBC AUTO-ENTMCNC: 30 G/DL (ref 32–37)
MCV RBC AUTO: 93 FL (ref 78–97)
MONOCYTE: 11 %
NEUTROPHIL: 70 %
NRBC BLD MANUAL-RTO: 0 % (ref 0–0.2)
PLATELET: 224 K/CUMM (ref 150–450)
POC_GLU: 106 MG/DL (ref 70–99)
POC_GLU: 113 MG/DL (ref 70–99)
POC_GLU: 130 MG/DL (ref 70–99)
PROTHROMBIN TIME: 13 SECONDS (ref 9.7–11.6)
RBC # BLD: 3.81 M/CUMM (ref 3.7–5.2)
RDW: 18.6 % (ref 11.5–14.5)
TECH_ID: ABNORMAL
TRIGL SERPL-MCNC: 85 MG/DL
WBC # BLD: 5.4 K/CUMM (ref 4–11)

## 2017-08-11 LAB
ABS NEUTRO: 2.5 K/CUMM (ref 2–8)
APTT PPP: 30 SECONDS (ref 22–30)
APTT PPP: 40 SECONDS (ref 22–30)
APTT PPP: 45 SECONDS (ref 22–30)
HCT VFR BLD CALC: 37 % (ref 33–45)
HGB BLD-MCNC: 11.2 G/DL (ref 11–15)
INSTR WBC: 4.3 K/CUMM (ref 4–11)
MCH RBC QN AUTO: 28 PG (ref 25–35)
MCHC RBC AUTO-ENTMCNC: 30 G/DL (ref 32–37)
MCV RBC AUTO: 94 FL (ref 78–97)
NRBC BLD MANUAL-RTO: 0 % (ref 0–0.2)
PLATELET: 214 K/CUMM (ref 150–450)
RBC # BLD: 3.94 M/CUMM (ref 3.7–5.2)
RDW: 18.6 % (ref 11.5–14.5)
WBC # BLD: 4.3 K/CUMM (ref 4–11)

## 2017-08-12 LAB
ABS NEUTRO: 3.2 K/CUMM (ref 2–8)
APTT PPP: 41 SECONDS (ref 22–30)
APTT PPP: 51 SECONDS (ref 22–30)
HCT VFR BLD CALC: 37 % (ref 33–45)
HGB BLD-MCNC: 11.3 G/DL (ref 11–15)
INSTR WBC: 4.5 K/CUMM (ref 4–11)
MCH RBC QN AUTO: 28 PG (ref 25–35)
MCHC RBC AUTO-ENTMCNC: 30 G/DL (ref 32–37)
MCV RBC AUTO: 93 FL (ref 78–97)
NRBC BLD MANUAL-RTO: 0 % (ref 0–0.2)
PLATELET: 219 K/CUMM (ref 150–450)
RBC # BLD: 3.98 M/CUMM (ref 3.7–5.2)
RDW: 17.9 % (ref 11.5–14.5)
WBC # BLD: 4.5 K/CUMM (ref 4–11)

## 2017-10-09 LAB
ALBUMIN: 3.6 G/DL
BUN: 18 MG/DL
CALCIUM: 8.8 MG/DL
CHLORIDE: 125 MEQ/L
CREATININE, SERUM: 0.97 MG/DL
GLUCOSE: 125 MG/DL
HEMATOCRIT: 33.9 %
HEMOGLOBIN: 11.3 G/DL
PLATELETS: 169 K/UL
POTASSIUM, SERUM: 4 MEQ/L
RED BLOOD COUNT: 3.59 X 10-6/U
SODIUM: 138 MEQ/L
WHITE BLOOD COUNT: 3.4 X 10-3/U

## 2019-01-01 ENCOUNTER — HOSPITAL (OUTPATIENT)
Dept: OTHER | Age: 84
End: 2019-01-01
Attending: INTERNAL MEDICINE

## 2019-01-01 ENCOUNTER — HOSPITAL (OUTPATIENT)
Dept: OTHER | Age: 84
End: 2019-01-01

## 2019-01-01 VITALS
HEART RATE: 94 BPM | RESPIRATION RATE: 20 BRPM | OXYGEN SATURATION: 95 % | HEIGHT: 61 IN | WEIGHT: 131 LBS | SYSTOLIC BLOOD PRESSURE: 140 MMHG | DIASTOLIC BLOOD PRESSURE: 60 MMHG | BODY MASS INDEX: 24.73 KG/M2

## 2019-01-01 LAB
A/G RATIO_: 0.7
A/G RATIO_: 0.8
ABS LYMPH MAN: 0.3 K/CUMM (ref 1–3.5)
ABS LYMPH: 0.6 K/CUMM (ref 1–3.5)
ABS LYMPH: 0.7 K/CUMM (ref 1–3.5)
ABS MONO MAN: 1 K/CUMM (ref 0.1–0.8)
ABS MONO: 0.8 K/CUMM (ref 0.1–0.8)
ABS MONO: 0.9 K/CUMM (ref 0.1–0.8)
ABS NEUT MAN: 12.6 K/CUMM (ref 1.8–7.8)
ABS NEUTRO: 10.1 K/CUMM (ref 2–8)
ABS NEUTRO: 10.4 K/CUMM (ref 2–8)
ALBUMIN: 2.9 G/DL (ref 3.5–5)
ALBUMIN: 3 G/DL (ref 3.5–5)
ALK PHOS: 80 UNIT/L (ref 50–124)
ALK PHOS: 85 UNIT/L (ref 50–124)
ALT/GPT: 10 UNIT/L (ref 0–55)
ALT/GPT: 12 UNIT/L (ref 0–55)
ANION GAP SERPL CALC-SCNC: 11 MEQ/L (ref 10–20)
ANION GAP SERPL CALC-SCNC: 11 MEQ/L (ref 10–20)
ANION GAP SERPL CALC-SCNC: 13 MEQ/L (ref 10–20)
ANION GAP SERPL CALC-SCNC: 15 MEQ/L (ref 10–20)
APTT PPP: 27 SECONDS (ref 22–30)
AST/GOT: 14 UNIT/L (ref 5–34)
AST/GOT: 18 UNIT/L (ref 5–34)
BAND MAN: 17 % (ref 2–8)
BASOPHIL MAN: 0 % (ref 0–1)
BASOPHIL: 0 % (ref 0–1)
BASOPHIL: 1 % (ref 0–1)
BILI TOTAL: 1.3 MG/DL (ref 0.2–1)
BILI TOTAL: 1.9 MG/DL (ref 0.2–1)
BNP: 315 PG/ML (ref 1–100)
BUN SERPL-MCNC: 11 MG/DL (ref 6–20)
BUN SERPL-MCNC: 11 MG/DL (ref 6–20)
BUN SERPL-MCNC: 14 MG/DL (ref 6–20)
BUN SERPL-MCNC: 8 MG/DL (ref 6–20)
CALCIUM: 9 MG/DL (ref 8.4–10.2)
CALCIUM: 9 MG/DL (ref 8.4–10.2)
CALCIUM: 9.1 MG/DL (ref 8.4–10.2)
CALCIUM: 9.6 MG/DL (ref 8.4–10.2)
CHLORIDE: 100 MEQ/L (ref 97–107)
CHLORIDE: 101 MEQ/L (ref 97–107)
CHLORIDE: 103 MEQ/L (ref 97–107)
CHLORIDE: 105 MEQ/L (ref 97–107)
CREATININE: 0.72 MG/DL (ref 0.6–1.3)
CREATININE: 0.72 MG/DL (ref 0.6–1.3)
CREATININE: 0.74 MG/DL (ref 0.6–1.3)
CREATININE: 0.85 MG/DL (ref 0.6–1.3)
DIFF_TYPE?: ABNORMAL
EOS MAN: 0 % (ref 0–4)
EOSINOPHIL: 0 % (ref 0–6)
EOSINOPHIL: 0 % (ref 0–6)
GLOBULIN_: 3.7 G/DL (ref 2–4.1)
GLOBULIN_: 4.4 G/DL (ref 2–4.1)
GLUCOSE LVL: 114 MG/DL (ref 70–99)
GLUCOSE LVL: 140 MG/DL (ref 70–99)
GLUCOSE LVL: 162 MG/DL (ref 70–99)
GLUCOSE LVL: 166 MG/DL (ref 70–99)
HCT VFR BLD CALC: 35 % (ref 33–45)
HCT VFR BLD CALC: 35 % (ref 33–45)
HCT VFR BLD CALC: 37 % (ref 33–45)
HEMOLYSIS 2+: NEGATIVE
HEMOLYSIS 4+: NEGATIVE
HGB BLD-MCNC: 11.5 G/DL (ref 11–15)
HGB BLD-MCNC: 11.8 G/DL (ref 11–15)
HGB BLD-MCNC: 12 G/DL (ref 11–15)
ICTERIC 4+: NEGATIVE
ICTERIC 4+: NEGATIVE
IMMATURE GRAN: 0.5 % (ref 0–0.3)
IMMATURE GRAN: 1.6 % (ref 0–0.3)
INR: 1.2 (ref 0.9–1.1)
INSTR WBC: 11.8 K/CUMM (ref 4–11)
INSTR WBC: 12.1 K/CUMM (ref 4–11)
INSTR WBC: 13.8 K/CUMM (ref 4–11)
LACTIC ACID: 1 MMOL/L (ref 0.5–2.2)
LIPEMIC 1+: NEGATIVE
LIPEMIC 3+: NEGATIVE
LIPEMIC 3+: NEGATIVE
LYMPH MAN: 2 %
LYMPHOCYTE: 5 %
LYMPHOCYTE: 6 %
MCH RBC QN AUTO: 33 PG (ref 25–35)
MCHC RBC AUTO-ENTMCNC: 33 G/DL (ref 32–37)
MCHC RBC AUTO-ENTMCNC: 33 G/DL (ref 32–37)
MCHC RBC AUTO-ENTMCNC: 34 G/DL (ref 32–37)
MCV RBC AUTO: 100 FL (ref 78–97)
MCV RBC AUTO: 100 FL (ref 78–97)
MCV RBC AUTO: 98 FL (ref 78–97)
MONOCYTE MAN: 7 %
MONOCYTE: 7 %
MONOCYTE: 8 %
NEUTROPHIL: 85 %
NEUTROPHIL: 86 %
NRBC BLD MANUAL-RTO: 0.1 % (ref 0–0.2)
NRBC BLD MANUAL-RTO: 0.2 % (ref 0–0.2)
NRBC BLD MANUAL-RTO: 0.2 % (ref 0–0.2)
PLATELET: 161 K/CUMM (ref 150–450)
PLATELET: 164 K/CUMM (ref 150–450)
PLATELET: 218 K/CUMM (ref 150–450)
PLT ESTIMATE: ADEQUATE
POTASSIUM: 3.3 MEQ/L (ref 3.5–5.1)
POTASSIUM: 3.7 MEQ/L (ref 3.5–5.1)
POTASSIUM: 3.8 MEQ/L (ref 3.5–5.1)
POTASSIUM: 3.8 MEQ/L (ref 3.5–5.1)
PROTHROMBIN TIME: 11.8 SECONDS (ref 9.7–11.6)
RBC # BLD: 3.48 M/CUMM (ref 3.7–5.2)
RBC # BLD: 3.56 M/CUMM (ref 3.7–5.2)
RBC # BLD: 3.67 M/CUMM (ref 3.7–5.2)
RBC MORPH: ABNORMAL
RDW: 15.1 % (ref 11.5–14.5)
RDW: 15.1 % (ref 11.5–14.5)
RDW: 15.2 % (ref 11.5–14.5)
SEGS MAN: 74 %
SODIUM: 137 MEQ/L (ref 136–145)
SODIUM: 138 MEQ/L (ref 136–145)
SODIUM: 140 MEQ/L (ref 136–145)
SODIUM: 143 MEQ/L (ref 136–145)
TCO2: 26 MEQ/L (ref 19–29)
TCO2: 26 MEQ/L (ref 19–29)
TCO2: 27 MEQ/L (ref 19–29)
TCO2: 35 MEQ/L (ref 19–29)
TOTAL LYMPHS MANUAL: 2 %
TOTAL PROTEIN: 6.7 G/DL (ref 6.4–8.3)
TOTAL PROTEIN: 7.3 G/DL (ref 6.4–8.3)
TROPONIN I: 0.03 NG/ML
UA APPEAR: CLEAR
UA BACTERIA: ABNORMAL
UA BILI: NEGATIVE
UA BLOOD: ABNORMAL
UA CAST: ABNORMAL
UA COLOR: YELLOW
UA CRYSTAL: ABNORMAL
UA EPITHELIAL: ABNORMAL
UA GLUCOSE: NEGATIVE
UA KETONES: NEGATIVE
UA LEUK EST: NEGATIVE
UA NITRITE: NEGATIVE
UA PH: 5.5 (ref 5–7)
UA PROTEIN: ABNORMAL
UA RBC: ABNORMAL
UA SPEC GRAV: 1.02 (ref 1.01–1.02)
UA UROBILINOGEN: 1 MG/DL (ref 0.2–1)
UA WBC: ABNORMAL
VANCO TR: 7.3 UG/ML (ref 5–15)
WBC # BLD: 11.8 K/CUMM (ref 4–11)
WBC # BLD: 12.1 K/CUMM (ref 4–11)
WBC # BLD: 13.8 K/CUMM (ref 4–11)

## 2019-01-01 PROCEDURE — 99233 SBSQ HOSP IP/OBS HIGH 50: CPT | Performed by: INTERNAL MEDICINE

## 2019-01-01 PROCEDURE — 99223 1ST HOSP IP/OBS HIGH 75: CPT | Performed by: INTERNAL MEDICINE

## 2021-03-05 DIAGNOSIS — Z23 NEED FOR VACCINATION: ICD-10-CM

## (undated) NOTE — MR AVS SNAPSHOT
[facility]  [address] [place] [address]  [phone]               Thank you for choosing us for your health care visit with Brisa Desai MD.  We are glad to serve you and happy to provide you with this summary of your visit. Take 1 tablet (40 mg total) by mouth once daily. Commonly known as:  PRINIVIL,ZESTRIL           MetFORMIN HCl 500 MG Tabs   Take 100 mg by mouth.  take 1 tablet (500MG)  by oral route 2 times every day with morning and evening meals   Commonly known as: DASH eating plan Adopt a diet rich in fruits, vegetables, and low fat dairy products with reduced content of saturated and total fat.    Dietary sodium reduction Reduce dietary sodium intake to <= 100 mmol per day (2.4 g sodium or 6 g sodium chloride)   Aer

## (undated) NOTE — LETTER
12/5/2017              [address]         Dear Dorota Mahajan records indicate that the tests ordered for you by Julienne Davis MD  have not been done.   If you have, in fact, already completed the te